# Patient Record
Sex: FEMALE | Race: WHITE | NOT HISPANIC OR LATINO | Employment: FULL TIME | URBAN - METROPOLITAN AREA
[De-identification: names, ages, dates, MRNs, and addresses within clinical notes are randomized per-mention and may not be internally consistent; named-entity substitution may affect disease eponyms.]

---

## 2023-12-01 ENCOUNTER — OFFICE VISIT (OUTPATIENT)
Dept: URGENT CARE | Facility: URGENT CARE | Age: 38
End: 2023-12-01
Payer: COMMERCIAL

## 2023-12-01 VITALS
SYSTOLIC BLOOD PRESSURE: 149 MMHG | HEART RATE: 74 BPM | TEMPERATURE: 97.7 F | DIASTOLIC BLOOD PRESSURE: 91 MMHG | OXYGEN SATURATION: 97 %

## 2023-12-01 DIAGNOSIS — J02.9 ACUTE PHARYNGITIS, UNSPECIFIED ETIOLOGY: Primary | ICD-10-CM

## 2023-12-01 LAB
DEPRECATED S PYO AG THROAT QL EIA: NEGATIVE
GROUP A STREP BY PCR: NOT DETECTED

## 2023-12-01 PROCEDURE — 99203 OFFICE O/P NEW LOW 30 MIN: CPT

## 2023-12-01 PROCEDURE — 87651 STREP A DNA AMP PROBE: CPT

## 2023-12-01 RX ORDER — LIDOCAINE HYDROCHLORIDE 20 MG/ML
10 SOLUTION OROPHARYNGEAL
Qty: 100 ML | Refills: 0 | Status: SHIPPED | OUTPATIENT
Start: 2023-12-01

## 2023-12-01 ASSESSMENT — ENCOUNTER SYMPTOMS
SINUS PAIN: 0
GASTROINTESTINAL NEGATIVE: 1
WHEEZING: 0
RHINORRHEA: 0
SINUS PRESSURE: 0
CHEST TIGHTNESS: 0
SORE THROAT: 1
COUGH: 1
CARDIOVASCULAR NEGATIVE: 1
APPETITE CHANGE: 1

## 2023-12-01 NOTE — PROGRESS NOTES
Assessment & Plan     Acute pharyngitis, unspecified etiology    - Streptococcus A Rapid Screen w/Reflex to PCR - Clinic Collect  - Group A Streptococcus PCR Throat Swab  Pt having symptoms of acute pharyngitis, negative test result for strep pharyngitis today.    Symptomatic cares include: pushing fluids, rest, OTC cold medication such as Mucinex DM for symptomatic relief of cough as needed.  May continue using prescribed inhalers as well for symptomatic relief of chest tightness and cough as desired.. For the sore throat patient can use salt water gargles, cough drops, chloraseptic spray/drops and tylenol/ibuprofen. Follow up with PCP if no improvement in 4 to 5 days.     Seek urgent medical evaluation if there are new or worsening symptoms such as fever of 103 degrees F or greater, chest tightness, wheezing, chest pain, shortness of breath, facial pressure, severe headaches, trouble breathing, trouble swallowing, severe or worsening nausea/vomiting, or severe abdominal pain.     Pt/guardian verbalized understanding and agrees with the treatment plan.                     No follow-ups on file.    [facility] Provider  [facility] SUSY Valentin is a 38 year old, presenting for the following health issues:  Urgent Care (Sore throat x Tuesday /)      HPI     The patient is a 38-year-old female with a past medical history of asthma who presents for evaluation of sore throat onset 3 days ago.  Describes her pain currently is 10/10.  Hurts to swallow but still able to swallow food and fluids.  She has a cough today as well, currently at baseline.  She has felt warm on occasion but no fevers measured at home.  No concerns for breathing such as wheezing.  She does experience a little bit of chest tightness at night when laying down, relieved with use of inhalers.  No chest pain or shortness of breath currently.  No concerns with bowel or bladder function.  She has been taking some  over-the-counter medication typically once or twice per day, medications tried include ibuprofen and Tylenol, and offered limited relief.  She has completed 2 tests at home for COVID-19, both results were negative.              Review of Systems   Constitutional:  Positive for appetite change.   HENT:  Positive for sore throat. Negative for congestion, ear pain, rhinorrhea, sinus pressure and sinus pain.    Respiratory:  Positive for cough. Negative for chest tightness and wheezing.    Cardiovascular: Negative.    Gastrointestinal: Negative.    Genitourinary: Negative.             Objective    BP (!) 149/91   Pulse 74   Temp 97.7  F (36.5  C) (Tympanic)   SpO2 97%   There is no height or weight on file to calculate BMI.  Physical Exam  Constitutional:       General: She is not in acute distress.     Appearance: Normal appearance. She is not ill-appearing, toxic-appearing or diaphoretic.   HENT:      Head: Normocephalic and atraumatic.      Right Ear: Tympanic membrane, ear canal and external ear normal. No middle ear effusion. There is no impacted cerumen. No mastoid tenderness. Tympanic membrane is not injected, perforated, erythematous, retracted or bulging.      Left Ear: Tympanic membrane, ear canal and external ear normal.  No middle ear effusion. There is no impacted cerumen. No mastoid tenderness. Tympanic membrane is not injected, perforated, erythematous, retracted or bulging.      Nose: Nose normal. No congestion or rhinorrhea.      Mouth/Throat:      Mouth: Mucous membranes are moist.      Pharynx: Posterior oropharyngeal erythema present. No oropharyngeal exudate.   Cardiovascular:      Rate and Rhythm: Normal rate and regular rhythm.      Pulses: Normal pulses.      Heart sounds: Normal heart sounds. No murmur heard.  Pulmonary:      Effort: Pulmonary effort is normal. No respiratory distress.      Breath sounds: Normal breath sounds. No stridor. No wheezing, rhonchi or rales.   Chest:      Chest  wall: No tenderness.   Musculoskeletal:      Cervical back: Neck supple. No rigidity or tenderness. No muscular tenderness.   Lymphadenopathy:      Cervical: No cervical adenopathy.      Right cervical: No superficial, deep or posterior cervical adenopathy.     Left cervical: No superficial, deep or posterior cervical adenopathy.   Neurological:      Mental Status: She is alert and oriented to person, place, and time.      Sensory: No sensory deficit.

## 2023-12-01 NOTE — PATIENT INSTRUCTIONS
Symptomatic cares include: pushing fluids, rest, OTC cold medication such as Mucinex DM for symptomatic relief of cough as needed.  May continue using prescribed inhalers as well for symptomatic relief of chest tightness and cough as desired.. For the sore throat patient can use salt water gargles, cough drops, chloraseptic spray/drops and tylenol/ibuprofen. Follow up with PCP if no improvement in 4 to 5 days.     Seek urgent medical evaluation if there are new or worsening symptoms such as fever of 103 degrees F or greater, chest tightness, wheezing, chest pain, shortness of breath, facial pressure, severe headaches, trouble breathing, trouble swallowing, severe or worsening nausea/vomiting, or severe abdominal pain.